# Patient Record
Sex: FEMALE | Race: OTHER | NOT HISPANIC OR LATINO
[De-identification: names, ages, dates, MRNs, and addresses within clinical notes are randomized per-mention and may not be internally consistent; named-entity substitution may affect disease eponyms.]

---

## 2018-07-13 ENCOUNTER — RESULT REVIEW (OUTPATIENT)
Age: 43
End: 2018-07-13

## 2018-07-13 ENCOUNTER — OUTPATIENT (OUTPATIENT)
Dept: OUTPATIENT SERVICES | Facility: HOSPITAL | Age: 43
LOS: 1 days | End: 2018-07-13
Payer: COMMERCIAL

## 2018-07-13 VITALS
SYSTOLIC BLOOD PRESSURE: 126 MMHG | DIASTOLIC BLOOD PRESSURE: 87 MMHG | RESPIRATION RATE: 16 BRPM | HEART RATE: 71 BPM | HEIGHT: 65 IN | TEMPERATURE: 99 F | WEIGHT: 184.09 LBS | OXYGEN SATURATION: 100 %

## 2018-07-13 DIAGNOSIS — Z98.890 OTHER SPECIFIED POSTPROCEDURAL STATES: Chronic | ICD-10-CM

## 2018-07-13 DIAGNOSIS — K08.409 PARTIAL LOSS OF TEETH, UNSPECIFIED CAUSE, UNSPECIFIED CLASS: Chronic | ICD-10-CM

## 2018-07-13 DIAGNOSIS — Z90.49 ACQUIRED ABSENCE OF OTHER SPECIFIED PARTS OF DIGESTIVE TRACT: Chronic | ICD-10-CM

## 2018-07-13 DIAGNOSIS — Z98.51 TUBAL LIGATION STATUS: Chronic | ICD-10-CM

## 2018-07-13 DIAGNOSIS — N63.20 UNSPECIFIED LUMP IN THE LEFT BREAST, UNSPECIFIED QUADRANT: ICD-10-CM

## 2018-07-13 DIAGNOSIS — Z01.818 ENCOUNTER FOR OTHER PREPROCEDURAL EXAMINATION: ICD-10-CM

## 2018-07-13 DIAGNOSIS — D48.62 NEOPLASM OF UNCERTAIN BEHAVIOR OF LEFT BREAST: ICD-10-CM

## 2018-07-13 LAB
ANION GAP SERPL CALC-SCNC: 8 MMOL/L — SIGNIFICANT CHANGE UP (ref 5–17)
BUN SERPL-MCNC: 10 MG/DL — SIGNIFICANT CHANGE UP (ref 7–23)
CALCIUM SERPL-MCNC: 8.6 MG/DL — SIGNIFICANT CHANGE UP (ref 8.5–10.1)
CHLORIDE SERPL-SCNC: 103 MMOL/L — SIGNIFICANT CHANGE UP (ref 96–108)
CO2 SERPL-SCNC: 30 MMOL/L — SIGNIFICANT CHANGE UP (ref 22–31)
CREAT SERPL-MCNC: 0.99 MG/DL — SIGNIFICANT CHANGE UP (ref 0.5–1.3)
GLUCOSE SERPL-MCNC: 83 MG/DL — SIGNIFICANT CHANGE UP (ref 70–99)
HCT VFR BLD CALC: 37.8 % — SIGNIFICANT CHANGE UP (ref 34.5–45)
HGB BLD-MCNC: 13 G/DL — SIGNIFICANT CHANGE UP (ref 11.5–15.5)
MCHC RBC-ENTMCNC: 30.2 PG — SIGNIFICANT CHANGE UP (ref 27–34)
MCHC RBC-ENTMCNC: 34.4 GM/DL — SIGNIFICANT CHANGE UP (ref 32–36)
MCV RBC AUTO: 87.7 FL — SIGNIFICANT CHANGE UP (ref 80–100)
NRBC # BLD: 0 /100 WBCS — SIGNIFICANT CHANGE UP (ref 0–0)
PLATELET # BLD AUTO: 335 K/UL — SIGNIFICANT CHANGE UP (ref 150–400)
POTASSIUM SERPL-MCNC: 3 MMOL/L — LOW (ref 3.5–5.3)
POTASSIUM SERPL-SCNC: 3 MMOL/L — LOW (ref 3.5–5.3)
RBC # BLD: 4.31 M/UL — SIGNIFICANT CHANGE UP (ref 3.8–5.2)
RBC # FLD: 11.9 % — SIGNIFICANT CHANGE UP (ref 10.3–14.5)
SODIUM SERPL-SCNC: 141 MMOL/L — SIGNIFICANT CHANGE UP (ref 135–145)
WBC # BLD: 3.87 K/UL — SIGNIFICANT CHANGE UP (ref 3.8–10.5)
WBC # FLD AUTO: 3.87 K/UL — SIGNIFICANT CHANGE UP (ref 3.8–10.5)

## 2018-07-13 PROCEDURE — 80048 BASIC METABOLIC PNL TOTAL CA: CPT

## 2018-07-13 PROCEDURE — 84702 CHORIONIC GONADOTROPIN TEST: CPT

## 2018-07-13 PROCEDURE — 85027 COMPLETE CBC AUTOMATED: CPT

## 2018-07-13 PROCEDURE — 88321 CONSLTJ&REPRT SLD PREP ELSWR: CPT

## 2018-07-13 PROCEDURE — G0463: CPT

## 2018-07-13 NOTE — H&P PST ADULT - HISTORY OF PRESENT ILLNESS
This is a 41 y/o female with PMH: Left breast Mass X several years. s/p ( 1/2018): Neddle Biopsy: benign. Intermittent discomfort of Left Breast Mass. Scheduled: Excisional Bipsy Left breast Mass with Mammo Needle Localization.

## 2018-07-13 NOTE — H&P PST ADULT - PMH
Benign breast lumps  1/2018    Neddle Biopsy: benign  Multiparity    Rotator cuff tear, left  12/2017    surgically treated  Vitamin D deficiency Benign breast lumps  1/2018    Neddle Biopsy: benign  Gallstones  ' 2001  Medial meniscus tear  '90's   Bilateral Knees: surgically treated  Multiparity    Rotator cuff tear, left  12/2017    surgically treated  Vitamin D deficiency

## 2018-07-13 NOTE — H&P PST ADULT - NSANTHOSAYNRD_GEN_A_CORE
No. MAYTE screening performed.  STOP BANG Legend: 0-2 = LOW Risk; 3-4 = INTERMEDIATE Risk; 5-8 = HIGH Risk

## 2018-07-13 NOTE — H&P PST ADULT - PROBLEM SELECTOR PLAN 1
Excisional Biopsy Left Breast Mass with Mammo Needle Localization    Labs; CBC, BMP, HCG done at PST. Pre op and Hibiclens instructions reviewed and given. Take routine am meds DOS with sip of water. Avoid NSAIDs and OTC supplements. Verbalized understanding

## 2018-07-24 ENCOUNTER — TRANSCRIPTION ENCOUNTER (OUTPATIENT)
Age: 43
End: 2018-07-24

## 2018-07-24 LAB — PATHOLOGY CONSULT NOTE: SIGNIFICANT CHANGE UP

## 2018-07-25 ENCOUNTER — OUTPATIENT (OUTPATIENT)
Dept: OUTPATIENT SERVICES | Facility: HOSPITAL | Age: 43
LOS: 1 days | End: 2018-07-25
Payer: COMMERCIAL

## 2018-07-25 ENCOUNTER — RESULT REVIEW (OUTPATIENT)
Age: 43
End: 2018-07-25

## 2018-07-25 VITALS
RESPIRATION RATE: 16 BRPM | HEART RATE: 82 BPM | OXYGEN SATURATION: 100 % | SYSTOLIC BLOOD PRESSURE: 135 MMHG | DIASTOLIC BLOOD PRESSURE: 75 MMHG

## 2018-07-25 VITALS
HEIGHT: 65 IN | RESPIRATION RATE: 16 BRPM | SYSTOLIC BLOOD PRESSURE: 130 MMHG | DIASTOLIC BLOOD PRESSURE: 80 MMHG | HEART RATE: 78 BPM | OXYGEN SATURATION: 99 % | WEIGHT: 184.09 LBS | TEMPERATURE: 98 F

## 2018-07-25 DIAGNOSIS — Z98.890 OTHER SPECIFIED POSTPROCEDURAL STATES: Chronic | ICD-10-CM

## 2018-07-25 DIAGNOSIS — Z90.49 ACQUIRED ABSENCE OF OTHER SPECIFIED PARTS OF DIGESTIVE TRACT: Chronic | ICD-10-CM

## 2018-07-25 DIAGNOSIS — Z98.51 TUBAL LIGATION STATUS: Chronic | ICD-10-CM

## 2018-07-25 DIAGNOSIS — K08.409 PARTIAL LOSS OF TEETH, UNSPECIFIED CAUSE, UNSPECIFIED CLASS: Chronic | ICD-10-CM

## 2018-07-25 DIAGNOSIS — D48.62 NEOPLASM OF UNCERTAIN BEHAVIOR OF LEFT BREAST: ICD-10-CM

## 2018-07-25 LAB — HCG UR QL: NEGATIVE — SIGNIFICANT CHANGE UP

## 2018-07-25 PROCEDURE — 76098 X-RAY EXAM SURGICAL SPECIMEN: CPT | Mod: 26

## 2018-07-25 PROCEDURE — 88305 TISSUE EXAM BY PATHOLOGIST: CPT | Mod: 26

## 2018-07-25 RX ORDER — CEFAZOLIN SODIUM 1 G
1000 VIAL (EA) INJECTION ONCE
Qty: 0 | Refills: 0 | Status: DISCONTINUED | OUTPATIENT
Start: 2018-07-25 | End: 2018-07-25

## 2018-07-25 RX ORDER — ONDANSETRON 8 MG/1
4 TABLET, FILM COATED ORAL ONCE
Qty: 0 | Refills: 0 | Status: COMPLETED | OUTPATIENT
Start: 2018-07-25 | End: 2018-07-25

## 2018-07-25 RX ORDER — HYDROMORPHONE HYDROCHLORIDE 2 MG/ML
0.5 INJECTION INTRAMUSCULAR; INTRAVENOUS; SUBCUTANEOUS
Qty: 0 | Refills: 0 | Status: DISCONTINUED | OUTPATIENT
Start: 2018-07-25 | End: 2018-07-26

## 2018-07-25 RX ORDER — OXYCODONE HYDROCHLORIDE 5 MG/1
5 TABLET ORAL ONCE
Qty: 0 | Refills: 0 | Status: DISCONTINUED | OUTPATIENT
Start: 2018-07-25 | End: 2018-07-26

## 2018-07-25 RX ORDER — SODIUM CHLORIDE 9 MG/ML
1000 INJECTION, SOLUTION INTRAVENOUS
Qty: 0 | Refills: 0 | Status: DISCONTINUED | OUTPATIENT
Start: 2018-07-25 | End: 2018-07-26

## 2018-07-25 RX ORDER — SODIUM CHLORIDE 9 MG/ML
3 INJECTION INTRAMUSCULAR; INTRAVENOUS; SUBCUTANEOUS ONCE
Qty: 0 | Refills: 0 | Status: DISCONTINUED | OUTPATIENT
Start: 2018-07-25 | End: 2018-07-25

## 2018-07-25 RX ORDER — SODIUM CHLORIDE 9 MG/ML
1000 INJECTION, SOLUTION INTRAVENOUS
Qty: 0 | Refills: 0 | Status: DISCONTINUED | OUTPATIENT
Start: 2018-07-25 | End: 2018-07-25

## 2018-07-25 RX ADMIN — SODIUM CHLORIDE 100 MILLILITER(S): 9 INJECTION, SOLUTION INTRAVENOUS at 14:06

## 2018-07-25 RX ADMIN — ONDANSETRON 4 MILLIGRAM(S): 8 TABLET, FILM COATED ORAL at 14:09

## 2018-07-25 NOTE — ASU PATIENT PROFILE, ADULT - PMH
Benign breast lumps  1/2018    Neddle Biopsy: benign  Gallstones  ' 2001  Medial meniscus tear  '90's   Bilateral Knees: surgically treated  Multiparity    Rotator cuff tear, left  12/2017    surgically treated  Vitamin D deficiency

## 2018-07-25 NOTE — ASU PATIENT PROFILE, ADULT - PSH
H/O needle biopsy  1/2018   Left Breast Mass: benign  H/O tubal ligation  ' 2011  H/O wisdom tooth extraction  7-12-18    X2  Normal spontaneous vaginal delivery  '01, '04  and ' 09  S/P abdominoplasty  ' 2012  S/P left knee arthroscopy  ' 90's  S/P right knee arthroscopy  '90's  S/P rotator cuff repair  12/2017  Left  Status post cholecystectomy  ' 2001   Laproscopic

## 2018-07-25 NOTE — ASU DISCHARGE PLAN (ADULT/PEDIATRIC). - MEDICATION SUMMARY - MEDICATIONS TO TAKE
I will START or STAY ON the medications listed below when I get home from the hospital:    Norco 5 mg-325 mg oral tablet  -- 1 tab(s) by mouth every 4 to 6 hours, As Needed MDD:24   -- Caution federal law prohibits the transfer of this drug to any person other  than the person for whom it was prescribed.  May cause drowsiness.  Alcohol may intensify this effect.  Use care when operating dangerous machinery.  This product contains acetaminophen.  Do not use  with any other product containing acetaminophen to prevent possible liver damage.  Using more of this medication than prescribed may cause serious breathing problems.    -- Indication: For pain    amoxicillin 500 mg oral capsule  -- 1 cap(s) by mouth 3 times a day X 7 days: started 7-13-18 ( prophylaxis s/p [place] tooth extraction 7-12-18)  -- Indication: For prior    Vitamin D2 50,000 intl units (1.25 mg) oral capsule  -- 1 cap(s) by mouth once a week: Thursday   -- Indication: For prior

## 2018-07-25 NOTE — ASU DISCHARGE PLAN (ADULT/PEDIATRIC). - NURSING INSTRUCTIONS
Remember to wash your hands frequently  Call your surgeon if your wound or surgical site shows any sign of infection such as: 1. Fever/chills 2.  Pus or drainage,  3. bad smell coming from area 4. hot to touch 5. redness or swelling 6. painful or sore to touch.

## 2018-07-25 NOTE — ASU DISCHARGE PLAN (ADULT/PEDIATRIC). - MEDICATION SUMMARY - MEDICATIONS TO STOP TAKING
I will STOP taking the medications listed below when I get home from the hospital:    oxyCODONE-acetaminophen 5 mg-325 mg oral tablet  -- 1 tab(s) by mouth every 6 hours

## 2018-07-26 PROCEDURE — 19281 PERQ DEVICE BREAST 1ST IMAG: CPT

## 2018-07-26 PROCEDURE — 88305 TISSUE EXAM BY PATHOLOGIST: CPT

## 2018-07-26 PROCEDURE — 81025 URINE PREGNANCY TEST: CPT

## 2018-07-26 PROCEDURE — C1889: CPT

## 2018-07-26 PROCEDURE — 19281 PERQ DEVICE BREAST 1ST IMAG: CPT | Mod: LT

## 2018-07-26 PROCEDURE — 76098 X-RAY EXAM SURGICAL SPECIMEN: CPT

## 2018-07-26 PROCEDURE — 19125 EXCISION BREAST LESION: CPT | Mod: LT

## 2018-07-31 LAB — SURGICAL PATHOLOGY FINAL REPORT - CH: SIGNIFICANT CHANGE UP

## 2019-09-11 ENCOUNTER — TRANSCRIPTION ENCOUNTER (OUTPATIENT)
Age: 44
End: 2019-09-11

## 2019-11-18 NOTE — ASU PREOP CHECKLIST - PATIENT SENT TO
Complex Repair And Skin Substitute Graft Text: The defect edges were debeveled with a #15 scalpel blade.  The primary defect was closed partially with a complex linear closure.  Given the location of the remaining defect, shape of the defect and the proximity to free margins a skin substitute graft was deemed most appropriate to repair the remaining defect.  The graft was trimmed to fit the size of the remaining defect.  The graft was then placed in the primary defect, oriented appropriately, and sutured into place. operating room

## 2020-10-19 ENCOUNTER — TRANSCRIPTION ENCOUNTER (OUTPATIENT)
Age: 45
End: 2020-10-19

## 2021-07-05 ENCOUNTER — TRANSCRIPTION ENCOUNTER (OUTPATIENT)
Age: 46
End: 2021-07-05

## 2022-02-17 ENCOUNTER — TRANSCRIPTION ENCOUNTER (OUTPATIENT)
Age: 47
End: 2022-02-17

## 2022-03-19 NOTE — BRIEF OPERATIVE NOTE - PRE-OP
General Surgery Progress Note    Patient:  Ailyn Chaney Date:  3/19/2022   :  1950 Attending:  Vijay Altamirano MD   [phone] [id number] 24 Hour Total [phone] Click on this checkbox to enter Pre-Op Dx

## 2022-03-29 NOTE — H&P PST ADULT - HEMATOLOGY/LYMPHATICS COMMENTS
Impression: Regular astigmatism, bilateral: H52.223. Plan: New spec Rx given - Discussed changes and possible adaptation period. 

RTC 1 year/PRN Vitamin D Deficiency

## 2022-05-06 PROBLEM — S83.249A OTHER TEAR OF MEDIAL MENISCUS, CURRENT INJURY, UNSPECIFIED KNEE, INITIAL ENCOUNTER: Chronic | Status: ACTIVE | Noted: 2018-07-13

## 2022-05-06 PROBLEM — M75.102 UNSPECIFIED ROTATOR CUFF TEAR OR RUPTURE OF LEFT SHOULDER, NOT SPECIFIED AS TRAUMATIC: Chronic | Status: ACTIVE | Noted: 2018-07-13

## 2022-05-06 PROBLEM — Z64.1 PROBLEMS RELATED TO MULTIPARITY: Chronic | Status: ACTIVE | Noted: 2018-07-13

## 2022-05-06 PROBLEM — E55.9 VITAMIN D DEFICIENCY, UNSPECIFIED: Chronic | Status: ACTIVE | Noted: 2018-07-13

## 2022-05-06 PROBLEM — N63.0 UNSPECIFIED LUMP IN UNSPECIFIED BREAST: Chronic | Status: ACTIVE | Noted: 2018-07-13

## 2022-05-06 PROBLEM — K80.20 CALCULUS OF GALLBLADDER WITHOUT CHOLECYSTITIS WITHOUT OBSTRUCTION: Chronic | Status: ACTIVE | Noted: 2018-07-13

## 2022-05-16 RX ORDER — AMOXICILLIN 250 MG/5ML
1 SUSPENSION, RECONSTITUTED, ORAL (ML) ORAL
Qty: 0 | Refills: 0 | DISCHARGE
Start: 2022-05-16 | End: 2022-05-26

## 2022-05-17 ENCOUNTER — OUTPATIENT (OUTPATIENT)
Dept: OUTPATIENT SERVICES | Facility: HOSPITAL | Age: 47
LOS: 1 days | End: 2022-05-17
Payer: COMMERCIAL

## 2022-05-17 VITALS
OXYGEN SATURATION: 97 % | DIASTOLIC BLOOD PRESSURE: 89 MMHG | HEIGHT: 64 IN | SYSTOLIC BLOOD PRESSURE: 138 MMHG | WEIGHT: 197.09 LBS

## 2022-05-17 DIAGNOSIS — Z90.13 ACQUIRED ABSENCE OF BILATERAL BREASTS AND NIPPLES: ICD-10-CM

## 2022-05-17 DIAGNOSIS — Z85.3 PERSONAL HISTORY OF MALIGNANT NEOPLASM OF BREAST: ICD-10-CM

## 2022-05-17 DIAGNOSIS — Z98.890 OTHER SPECIFIED POSTPROCEDURAL STATES: Chronic | ICD-10-CM

## 2022-05-17 DIAGNOSIS — Z90.49 ACQUIRED ABSENCE OF OTHER SPECIFIED PARTS OF DIGESTIVE TRACT: Chronic | ICD-10-CM

## 2022-05-17 DIAGNOSIS — I10 ESSENTIAL (PRIMARY) HYPERTENSION: ICD-10-CM

## 2022-05-17 DIAGNOSIS — Z98.51 TUBAL LIGATION STATUS: Chronic | ICD-10-CM

## 2022-05-17 DIAGNOSIS — N62 HYPERTROPHY OF BREAST: ICD-10-CM

## 2022-05-17 DIAGNOSIS — Z01.818 ENCOUNTER FOR OTHER PREPROCEDURAL EXAMINATION: ICD-10-CM

## 2022-05-17 DIAGNOSIS — K08.409 PARTIAL LOSS OF TEETH, UNSPECIFIED CAUSE, UNSPECIFIED CLASS: Chronic | ICD-10-CM

## 2022-05-17 LAB
ANION GAP SERPL CALC-SCNC: 6 MMOL/L — SIGNIFICANT CHANGE UP (ref 5–17)
BUN SERPL-MCNC: 12 MG/DL — SIGNIFICANT CHANGE UP (ref 7–23)
CALCIUM SERPL-MCNC: 8.7 MG/DL — SIGNIFICANT CHANGE UP (ref 8.4–10.5)
CHLORIDE SERPL-SCNC: 105 MMOL/L — SIGNIFICANT CHANGE UP (ref 96–108)
CO2 SERPL-SCNC: 29 MMOL/L — SIGNIFICANT CHANGE UP (ref 22–31)
CREAT SERPL-MCNC: 1.04 MG/DL — SIGNIFICANT CHANGE UP (ref 0.5–1.3)
EGFR: 67 ML/MIN/1.73M2 — SIGNIFICANT CHANGE UP
GLUCOSE SERPL-MCNC: 89 MG/DL — SIGNIFICANT CHANGE UP (ref 70–99)
HCT VFR BLD CALC: 40.2 % — SIGNIFICANT CHANGE UP (ref 34.5–45)
HGB BLD-MCNC: 13.4 G/DL — SIGNIFICANT CHANGE UP (ref 11.5–15.5)
MCHC RBC-ENTMCNC: 29.8 PG — SIGNIFICANT CHANGE UP (ref 27–34)
MCHC RBC-ENTMCNC: 33.3 GM/DL — SIGNIFICANT CHANGE UP (ref 32–36)
MCV RBC AUTO: 89.5 FL — SIGNIFICANT CHANGE UP (ref 80–100)
NRBC # BLD: 0 /100 WBCS — SIGNIFICANT CHANGE UP (ref 0–0)
PLATELET # BLD AUTO: 306 K/UL — SIGNIFICANT CHANGE UP (ref 150–400)
POTASSIUM SERPL-MCNC: 3.6 MMOL/L — SIGNIFICANT CHANGE UP (ref 3.5–5.3)
POTASSIUM SERPL-SCNC: 3.6 MMOL/L — SIGNIFICANT CHANGE UP (ref 3.5–5.3)
RBC # BLD: 4.49 M/UL — SIGNIFICANT CHANGE UP (ref 3.8–5.2)
RBC # FLD: 12.2 % — SIGNIFICANT CHANGE UP (ref 10.3–14.5)
SODIUM SERPL-SCNC: 140 MMOL/L — SIGNIFICANT CHANGE UP (ref 135–145)
WBC # BLD: 4.55 K/UL — SIGNIFICANT CHANGE UP (ref 3.8–10.5)
WBC # FLD AUTO: 4.55 K/UL — SIGNIFICANT CHANGE UP (ref 3.8–10.5)

## 2022-05-17 PROCEDURE — 36415 COLL VENOUS BLD VENIPUNCTURE: CPT

## 2022-05-17 PROCEDURE — 80048 BASIC METABOLIC PNL TOTAL CA: CPT

## 2022-05-17 PROCEDURE — G0463: CPT

## 2022-05-17 PROCEDURE — 85027 COMPLETE CBC AUTOMATED: CPT

## 2022-05-17 RX ORDER — AMOXICILLIN 250 MG/5ML
1 SUSPENSION, RECONSTITUTED, ORAL (ML) ORAL
Qty: 0 | Refills: 0 | DISCHARGE

## 2022-05-17 NOTE — H&P PST ADULT - PROBLEM SELECTOR PLAN 1
Scheduled for staged bilateral breast reduction with Dr Palacio on 05/31/2022.  COVID-19 pcr testing information provided.  Pre op instructions given and patient verbalized understanding.  CBC, BMP pending.  EK requested from pcp.  NPO after midnight night before procedure.  To stop all ASA, NSAIDs, vitamins and supplements 1 week prior to procedure.  Chlorhexidene wash given with instructions.

## 2022-05-17 NOTE — H&P PST ADULT - NSICDXPASTSURGICALHX_GEN_ALL_CORE_FT
PAST SURGICAL HISTORY:  H/O needle biopsy 1/2018, Left Breast Mass: benign  3/2022- right pos for malignancy    H/O tubal ligation ' 2011    H/O wisdom tooth extraction 7-12-18    X2    Normal spontaneous vaginal delivery '01, '04  and ' 09    S/P abdominoplasty ' 2012    S/P left knee arthroscopy ' 90's    S/P right knee arthroscopy '90's    S/P rotator cuff repair 12/2017  Left    Status post cholecystectomy ' 2001   Laproscopic

## 2022-05-17 NOTE — H&P PST ADULT - FALL HARM RISK - UNIVERSAL INTERVENTIONS
Bed in lowest position, wheels locked, appropriate side rails in place/Call bell, personal items and telephone in reach/Instruct patient to call for assistance before getting out of bed or chair/Non-slip footwear when patient is out of bed/[place] to call system/Physically safe environment - no spills, clutter or unnecessary equipment/Purposeful Proactive Rounding/Room/bathroom lighting operational, light cord in reach

## 2022-05-17 NOTE — H&P PST ADULT - HISTORY OF PRESENT ILLNESS
Detail Level: Zone This is a 43 y/o female with PMH: Left breast Mass X several years. s/p ( 1/2018): Neddle Biopsy: benign. Intermittent discom 47 y/o female with [facility] of HTN presents for [facility].  C/O abnormal mammogram in Feb 2022 resulting in biopsy of breast which was post for malignancy.  Having staged breast surgery with end result to be a mastectomy with implant placement.  Currently on Antibiotic for throat infection with 10 day course to completed 5/26/2022.  Other wise feeling well today at PST.  Scheduled for staged bilateral breast reduction with Dr Palacio on 05/31/2022.  COVID-19 pcr testing information provided

## 2022-05-17 NOTE — H&P PST ADULT - NSICDXFAMILYHX_GEN_ALL_CORE_FT
FAMILY HISTORY:  Father  Still living? Unknown  FH: renal failure, Age at diagnosis: Age Unknown  FH: type 2 diabetes, Age at diagnosis: Age Unknown    Mother  Still living? Unknown  FH: CAD (coronary artery disease), Age at diagnosis: Age Unknown  FH: type 2 diabetes, Age at diagnosis: Age Unknown    Sibling  Still living? Unknown  FH: non-Hodgkin's lymphoma, Age at diagnosis: Age Unknown

## 2022-05-17 NOTE — H&P PST ADULT - NSICDXPASTMEDICALHX_GEN_ALL_CORE_FT
PAST MEDICAL HISTORY:  Benign breast lumps 1/2018    Neddle Biopsy: benign    Gallstones ' 2001    Medial meniscus tear '90's   Bilateral Knees: surgically treated    Multiparity     Personal history of malignant neoplasm of breast     Rotator cuff tear, left 12/2017    surgically treated    Vitamin D deficiency

## 2022-05-17 NOTE — H&P PST ADULT - NSANTHOSAYNRD_GEN_A_CORE
neck 14 inches/No. MAYTE screening performed.  STOP BANG Legend: 0-2 = LOW Risk; 3-4 = INTERMEDIATE Risk; 5-8 = HIGH Risk

## 2022-05-28 ENCOUNTER — NON-APPOINTMENT (OUTPATIENT)
Age: 47
End: 2022-05-28

## 2022-05-30 ENCOUNTER — TRANSCRIPTION ENCOUNTER (OUTPATIENT)
Age: 47
End: 2022-05-30

## 2022-05-31 ENCOUNTER — RESULT REVIEW (OUTPATIENT)
Age: 47
End: 2022-05-31

## 2022-05-31 ENCOUNTER — TRANSCRIPTION ENCOUNTER (OUTPATIENT)
Age: 47
End: 2022-05-31

## 2022-05-31 ENCOUNTER — OUTPATIENT (OUTPATIENT)
Dept: OUTPATIENT SERVICES | Facility: HOSPITAL | Age: 47
LOS: 1 days | End: 2022-05-31
Payer: COMMERCIAL

## 2022-05-31 VITALS
TEMPERATURE: 99 F | HEIGHT: 64 IN | RESPIRATION RATE: 16 BRPM | OXYGEN SATURATION: 96 % | DIASTOLIC BLOOD PRESSURE: 82 MMHG | WEIGHT: 197.09 LBS | SYSTOLIC BLOOD PRESSURE: 131 MMHG | HEART RATE: 76 BPM

## 2022-05-31 VITALS
TEMPERATURE: 97 F | RESPIRATION RATE: 16 BRPM | DIASTOLIC BLOOD PRESSURE: 65 MMHG | HEART RATE: 78 BPM | OXYGEN SATURATION: 98 % | SYSTOLIC BLOOD PRESSURE: 130 MMHG

## 2022-05-31 DIAGNOSIS — Z98.51 TUBAL LIGATION STATUS: Chronic | ICD-10-CM

## 2022-05-31 DIAGNOSIS — Z98.890 OTHER SPECIFIED POSTPROCEDURAL STATES: Chronic | ICD-10-CM

## 2022-05-31 DIAGNOSIS — Z90.13 ACQUIRED ABSENCE OF BILATERAL BREASTS AND NIPPLES: ICD-10-CM

## 2022-05-31 DIAGNOSIS — K08.409 PARTIAL LOSS OF TEETH, UNSPECIFIED CAUSE, UNSPECIFIED CLASS: Chronic | ICD-10-CM

## 2022-05-31 DIAGNOSIS — Z85.3 PERSONAL HISTORY OF MALIGNANT NEOPLASM OF BREAST: ICD-10-CM

## 2022-05-31 DIAGNOSIS — Z90.49 ACQUIRED ABSENCE OF OTHER SPECIFIED PARTS OF DIGESTIVE TRACT: Chronic | ICD-10-CM

## 2022-05-31 DIAGNOSIS — N62 HYPERTROPHY OF BREAST: ICD-10-CM

## 2022-05-31 LAB — SARS-COV-2 RNA SPEC QL NAA+PROBE: SIGNIFICANT CHANGE UP

## 2022-05-31 PROCEDURE — 19318 BREAST REDUCTION: CPT | Mod: 50

## 2022-05-31 PROCEDURE — 87635 SARS-COV-2 COVID-19 AMP PRB: CPT

## 2022-05-31 PROCEDURE — 88305 TISSUE EXAM BY PATHOLOGIST: CPT | Mod: 26

## 2022-05-31 PROCEDURE — 88305 TISSUE EXAM BY PATHOLOGIST: CPT

## 2022-05-31 PROCEDURE — C9399: CPT

## 2022-05-31 RX ORDER — ACETAMINOPHEN 500 MG
2 TABLET ORAL
Qty: 0 | Refills: 0 | DISCHARGE

## 2022-05-31 RX ORDER — ERGOCALCIFEROL 1.25 MG/1
1 CAPSULE ORAL
Qty: 0 | Refills: 0 | DISCHARGE

## 2022-05-31 RX ORDER — SODIUM CHLORIDE 9 MG/ML
1000 INJECTION, SOLUTION INTRAVENOUS
Refills: 0 | Status: DISCONTINUED | OUTPATIENT
Start: 2022-05-31 | End: 2022-05-31

## 2022-05-31 RX ORDER — ONDANSETRON 8 MG/1
4 TABLET, FILM COATED ORAL ONCE
Refills: 0 | Status: COMPLETED | OUTPATIENT
Start: 2022-05-31 | End: 2022-05-31

## 2022-05-31 RX ORDER — MULTIVIT-MIN/FERROUS GLUCONATE 9 MG/15 ML
1 LIQUID (ML) ORAL
Qty: 0 | Refills: 0 | DISCHARGE

## 2022-05-31 RX ORDER — OXYCODONE AND ACETAMINOPHEN 5; 325 MG/1; MG/1
1 TABLET ORAL ONCE
Refills: 0 | Status: DISCONTINUED | OUTPATIENT
Start: 2022-05-31 | End: 2022-05-31

## 2022-05-31 RX ORDER — HYDROMORPHONE HYDROCHLORIDE 2 MG/ML
0.5 INJECTION INTRAMUSCULAR; INTRAVENOUS; SUBCUTANEOUS ONCE
Refills: 0 | Status: DISCONTINUED | OUTPATIENT
Start: 2022-05-31 | End: 2022-05-31

## 2022-05-31 RX ORDER — METOPROLOL TARTRATE 50 MG
1 TABLET ORAL
Qty: 0 | Refills: 0 | DISCHARGE

## 2022-05-31 RX ORDER — ONDANSETRON 8 MG/1
4 TABLET, FILM COATED ORAL ONCE
Refills: 0 | Status: DISCONTINUED | OUTPATIENT
Start: 2022-05-31 | End: 2022-05-31

## 2022-05-31 RX ORDER — VITAMIN E 100 UNIT
1 CAPSULE ORAL
Qty: 0 | Refills: 0 | DISCHARGE

## 2022-05-31 RX ADMIN — SODIUM CHLORIDE 50 MILLILITER(S): 9 INJECTION, SOLUTION INTRAVENOUS at 10:01

## 2022-05-31 RX ADMIN — HYDROMORPHONE HYDROCHLORIDE 0.5 MILLIGRAM(S): 2 INJECTION INTRAMUSCULAR; INTRAVENOUS; SUBCUTANEOUS at 14:47

## 2022-05-31 RX ADMIN — HYDROMORPHONE HYDROCHLORIDE 0.5 MILLIGRAM(S): 2 INJECTION INTRAMUSCULAR; INTRAVENOUS; SUBCUTANEOUS at 15:00

## 2022-05-31 RX ADMIN — ONDANSETRON 4 MILLIGRAM(S): 8 TABLET, FILM COATED ORAL at 17:30

## 2022-05-31 NOTE — ASU PATIENT PROFILE, ADULT - NSICDXPASTMEDICALHX_GEN_ALL_CORE_FT
PAST MEDICAL HISTORY:  Benign breast lumps 1/2018    Neddle Biopsy: benign    Gallstones ' 2001    Medial meniscus tear '90's   Bilateral Knees: surgically treated    Multiparity     Personal history of malignant neoplasm of breast     Rotator cuff tear, left 12/2017    surgically treated    Vitamin D deficiency      PAST MEDICAL HISTORY:  Benign breast lumps 1/2018    Neddle Biopsy: benign    Gallstones ' 2001    Medial meniscus tear '90's   Bilateral Knees: surgically treated    Multiparity     Personal history of malignant neoplasm of breast right breast    Rotator cuff tear, left 12/2017    surgically treated    Vitamin D deficiency

## 2022-05-31 NOTE — ASU DISCHARGE PLAN (ADULT/PEDIATRIC) - NS MD DC FALL RISK RISK
For information on Fall & Injury Prevention, visit: [email or web address] OR  [email or web address] OR  https://www.cdc.gov/steadi/patient.html

## 2022-05-31 NOTE — ASU DISCHARGE PLAN (ADULT/PEDIATRIC) - CARE PROVIDER_API CALL
Quincy Palacio  PLASTIC SURGERY  [address], [address]  [place] [address]  Phone: [phone]  Fax: [phone]  Established Patient  Scheduled Appointment: 06/06/2022

## 2022-05-31 NOTE — ASU PATIENT PROFILE, ADULT - PRO ARRIVE FROM
CT Heart Calcium report reviewed with a total coronary calcium score of 166.   Copy of report, CT Calcium Screening results letter (moderate coronary plaque identified) and Simple 7 patient education materials mailed to patient.  Report faxed to Dr. Albino Hernandez    Also noted was a finding of calcified RML granuloma and mild bronchiectasis in the lower lungs.    Results letter includes instructions to follow up with your primary care doctor on these findings.    
home

## 2022-06-06 LAB — SURGICAL PATHOLOGY STUDY: SIGNIFICANT CHANGE UP

## 2022-09-27 PROBLEM — Z85.3 PERSONAL HISTORY OF MALIGNANT NEOPLASM OF BREAST: Chronic | Status: ACTIVE | Noted: 2022-05-17

## 2022-09-29 ENCOUNTER — OUTPATIENT (OUTPATIENT)
Dept: OUTPATIENT SERVICES | Facility: HOSPITAL | Age: 47
LOS: 1 days | End: 2022-09-29
Payer: COMMERCIAL

## 2022-09-29 ENCOUNTER — APPOINTMENT (OUTPATIENT)
Dept: ULTRASOUND IMAGING | Facility: HOSPITAL | Age: 47
End: 2022-09-29

## 2022-09-29 DIAGNOSIS — Z98.890 OTHER SPECIFIED POSTPROCEDURAL STATES: Chronic | ICD-10-CM

## 2022-09-29 DIAGNOSIS — Z90.49 ACQUIRED ABSENCE OF OTHER SPECIFIED PARTS OF DIGESTIVE TRACT: Chronic | ICD-10-CM

## 2022-09-29 DIAGNOSIS — Z00.00 ENCOUNTER FOR GENERAL ADULT MEDICAL EXAMINATION WITHOUT ABNORMAL FINDINGS: ICD-10-CM

## 2022-09-29 DIAGNOSIS — Z98.51 TUBAL LIGATION STATUS: Chronic | ICD-10-CM

## 2022-09-29 DIAGNOSIS — K08.409 PARTIAL LOSS OF TEETH, UNSPECIFIED CAUSE, UNSPECIFIED CLASS: Chronic | ICD-10-CM

## 2022-09-29 PROCEDURE — 76642 ULTRASOUND BREAST LIMITED: CPT | Mod: 26,LT

## 2022-09-29 PROCEDURE — 76642 ULTRASOUND BREAST LIMITED: CPT

## 2022-11-18 ENCOUNTER — NON-APPOINTMENT (OUTPATIENT)
Age: 47
End: 2022-11-18

## 2023-05-23 ENCOUNTER — OUTPATIENT (OUTPATIENT)
Dept: OUTPATIENT SERVICES | Facility: HOSPITAL | Age: 48
LOS: 1 days | End: 2023-05-23
Payer: COMMERCIAL

## 2023-05-23 VITALS
WEIGHT: 194.89 LBS | TEMPERATURE: 98 F | RESPIRATION RATE: 18 BRPM | OXYGEN SATURATION: 100 % | HEIGHT: 65 IN | SYSTOLIC BLOOD PRESSURE: 125 MMHG | DIASTOLIC BLOOD PRESSURE: 84 MMHG | HEART RATE: 77 BPM

## 2023-05-23 DIAGNOSIS — Z98.890 OTHER SPECIFIED POSTPROCEDURAL STATES: Chronic | ICD-10-CM

## 2023-05-23 DIAGNOSIS — Z85.3 PERSONAL HISTORY OF MALIGNANT NEOPLASM OF BREAST: ICD-10-CM

## 2023-05-23 DIAGNOSIS — I10 ESSENTIAL (PRIMARY) HYPERTENSION: ICD-10-CM

## 2023-05-23 DIAGNOSIS — Z96.653 PRESENCE OF ARTIFICIAL KNEE JOINT, BILATERAL: Chronic | ICD-10-CM

## 2023-05-23 DIAGNOSIS — Z90.13 ACQUIRED ABSENCE OF BILATERAL BREASTS AND NIPPLES: ICD-10-CM

## 2023-05-23 DIAGNOSIS — Z01.818 ENCOUNTER FOR OTHER PREPROCEDURAL EXAMINATION: ICD-10-CM

## 2023-05-23 DIAGNOSIS — Z90.49 ACQUIRED ABSENCE OF OTHER SPECIFIED PARTS OF DIGESTIVE TRACT: Chronic | ICD-10-CM

## 2023-05-23 DIAGNOSIS — Z98.51 TUBAL LIGATION STATUS: Chronic | ICD-10-CM

## 2023-05-23 DIAGNOSIS — K08.409 PARTIAL LOSS OF TEETH, UNSPECIFIED CAUSE, UNSPECIFIED CLASS: Chronic | ICD-10-CM

## 2023-05-23 DIAGNOSIS — N65.0 DEFORMITY OF RECONSTRUCTED BREAST: ICD-10-CM

## 2023-05-23 DIAGNOSIS — Z90.13 ACQUIRED ABSENCE OF BILATERAL BREASTS AND NIPPLES: Chronic | ICD-10-CM

## 2023-05-23 LAB
ANION GAP SERPL CALC-SCNC: 7 MMOL/L — SIGNIFICANT CHANGE UP (ref 5–17)
BUN SERPL-MCNC: 11 MG/DL — SIGNIFICANT CHANGE UP (ref 7–23)
CALCIUM SERPL-MCNC: 8.8 MG/DL — SIGNIFICANT CHANGE UP (ref 8.4–10.5)
CHLORIDE SERPL-SCNC: 106 MMOL/L — SIGNIFICANT CHANGE UP (ref 96–108)
CO2 SERPL-SCNC: 26 MMOL/L — SIGNIFICANT CHANGE UP (ref 22–31)
CREAT SERPL-MCNC: 1.08 MG/DL — SIGNIFICANT CHANGE UP (ref 0.5–1.3)
EGFR: 63 ML/MIN/1.73M2 — SIGNIFICANT CHANGE UP
GLUCOSE SERPL-MCNC: 93 MG/DL — SIGNIFICANT CHANGE UP (ref 70–99)
HCT VFR BLD CALC: 39.1 % — SIGNIFICANT CHANGE UP (ref 34.5–45)
HGB BLD-MCNC: 13.2 G/DL — SIGNIFICANT CHANGE UP (ref 11.5–15.5)
MCHC RBC-ENTMCNC: 30.8 PG — SIGNIFICANT CHANGE UP (ref 27–34)
MCHC RBC-ENTMCNC: 33.8 GM/DL — SIGNIFICANT CHANGE UP (ref 32–36)
MCV RBC AUTO: 91.4 FL — SIGNIFICANT CHANGE UP (ref 80–100)
NRBC # BLD: 0 /100 WBCS — SIGNIFICANT CHANGE UP (ref 0–0)
PLATELET # BLD AUTO: 298 K/UL — SIGNIFICANT CHANGE UP (ref 150–400)
POTASSIUM SERPL-MCNC: 3.7 MMOL/L — SIGNIFICANT CHANGE UP (ref 3.5–5.3)
POTASSIUM SERPL-SCNC: 3.7 MMOL/L — SIGNIFICANT CHANGE UP (ref 3.5–5.3)
RBC # BLD: 4.28 M/UL — SIGNIFICANT CHANGE UP (ref 3.8–5.2)
RBC # FLD: 12 % — SIGNIFICANT CHANGE UP (ref 10.3–14.5)
SODIUM SERPL-SCNC: 139 MMOL/L — SIGNIFICANT CHANGE UP (ref 135–145)
WBC # BLD: 4.7 K/UL — SIGNIFICANT CHANGE UP (ref 3.8–10.5)
WBC # FLD AUTO: 4.7 K/UL — SIGNIFICANT CHANGE UP (ref 3.8–10.5)

## 2023-05-23 PROCEDURE — 36415 COLL VENOUS BLD VENIPUNCTURE: CPT

## 2023-05-23 PROCEDURE — G0463: CPT

## 2023-05-23 PROCEDURE — 93005 ELECTROCARDIOGRAM TRACING: CPT

## 2023-05-23 PROCEDURE — 93010 ELECTROCARDIOGRAM REPORT: CPT | Mod: NC

## 2023-05-23 PROCEDURE — 80048 BASIC METABOLIC PNL TOTAL CA: CPT

## 2023-05-23 PROCEDURE — 85027 COMPLETE CBC AUTOMATED: CPT

## 2023-05-23 RX ORDER — SODIUM CHLORIDE 9 MG/ML
1000 INJECTION, SOLUTION INTRAVENOUS
Refills: 0 | Status: DISCONTINUED | OUTPATIENT
Start: 2023-05-30 | End: 2023-05-30

## 2023-05-23 NOTE — H&P PST ADULT - PROBLEM SELECTOR PLAN 1
Scheduled for bilateral breast revision reconstruction with Dr Palacio on 05/30/2023.  Pre op instructions given and patient verbalized understanding.  CBC, BMP and EKG pending.  NPO after midnight night before procedure.  To stop all ASA, NSAIDs vitamins and supplements 1 week prior to procedure.  Chlorhexidene wash given with instructions

## 2023-05-23 NOTE — H&P PST ADULT - NSICDXFAMILYHX_GEN_ALL_CORE_FT
FAMILY HISTORY:  Father  Still living? Unknown  FH: type 2 diabetes, Age at diagnosis: Age Unknown    Mother  Still living? Unknown  FH: hypertension, Age at diagnosis: Age Unknown

## 2023-05-23 NOTE — H&P PST ADULT - NSICDXPASTSURGICALHX_GEN_ALL_CORE_FT
PAST SURGICAL HISTORY:  H/O abdominoplasty     H/O tubal ligation     History of arthroscopy of left shoulder     History of bilateral knee arthroplasty     S/P bilateral mastectomy

## 2023-05-23 NOTE — H&P PST ADULT - HISTORY OF PRESENT ILLNESS
48 y/o female with PMH of HTN and right breast DCIS (s/p bilateral mastectomy - no chemo or radiation) rpesents for PST.  For follow up breast surgery following mastectomy.  Felling well at [facility] today.  Scheduled for bilateral breast revision reconstruction with Dr Palacio on 05/30/2023.

## 2023-05-23 NOTE — H&P PST ADULT - NSANTHBPHIGHRD_ENT_A_CORE
David Rojo  Neurology Follow-up  [facility] Neurology    Date of Service: 11/16/2022    Subjective:   CC: Follow up today regarding: New onset speech impairment and right-sided numbness    Events noted. Chart and lab reviewed. Feels back to normal.  Notes she was unable to complete MRI due to claustrophobia and is refusing to try again. Father at bedside. ROS : A 10-12 system review obtained and updated today and is unremarkable except as mentioned  in my interval history. family history includes Heart Attack in her mother; Heart Disease in her mother; High Blood Pressure in her mother. She was adopted.     Past Medical History:   Diagnosis Date    Allergic rhinitis     Anxiety     Nosebleed     as a cild due to seasonal allergies    Vestibular migraine 01/27/2022     Current Facility-Administered Medications   Medication Dose Route Frequency Provider Last Rate Last Admin    ondansetron (ZOFRAN-ODT) disintegrating tablet 4 mg  4 mg Oral Q8H PRN Osman Macario MD        Or    ondansetron (ZOFRAN) injection 4 mg  4 mg IntraVENous Q6H PRN Osman Macario MD        polyethylene glycol (GLYCOLAX) packet 17 g  17 g Oral Daily PRN Osman Macario MD        enoxaparin (LOVENOX) injection 40 mg  40 mg SubCUTAneous Q24H Osman Macario MD        perflutren lipid microspheres (DEFINITY) injection 1.5 mL  1.5 mL IntraVENous ONCE PRN Osman Macario MD        acetaminophen (TYLENOL) tablet 650 mg  650 mg Oral Q4H PRN Osman Macario MD        Or    acetaminophen (TYLENOL) suppository 650 mg  650 mg Rectal Q4H PRN Osman Macario MD        rosuvastatin (CRESTOR) tablet 40 mg  40 mg Oral Nightly Osman Macario MD   40 mg at 11/15/22 2040    aspirin EC tablet 81 mg  81 mg Oral Daily Osman Macario MD   81 mg at 11/16/22 9693    Or    aspirin suppository 300 mg  300 mg Rectal Daily Osman Macario MD        labetalol (NORMODYNE;TRANDATE) injection 10 mg  10 mg IntraVENous Q10 Min PRN Osman Macario MD        0.9 % sodium chloride infusion IntraVENous Continuous Luca Javier MD   Stopped at 11/16/22 1410     Allergies   Allergen Reactions    Morphine      Makes her feel weird. reports that she has never smoked. She has never used smokeless tobacco. She reports that she does not drink alcohol and does not use drugs. Objective:  Exam:   Constitutional:   Vitals:    11/16/22 0819 11/16/22 0820 11/16/22 1121 11/16/22 1159   BP: 99/69 104/64 122/81 117/80   Pulse: (!) 103  99 92   Resp: 16 16 14   Temp: 98.1 °F (36.7 °C)  97.8 °F (36.6 °C) 97.8 °F (36.6 °C)   TempSrc: Oral  Oral Oral   SpO2: 97%  99% 100%   Weight:       Height:         General appearance:  Normal development and appear in no acute distress. Mental Status:   Oriented to person, place, problem, and time. Memory: Good immediate recall. Intact remote memory  Normal attention span and concentration. Language: intact naming, repeating and fluency   Good fund of Knowledge. Cranial Nerves:   II: Visual fields: Full. Pupils: equal, round, reactive to light  III,IV,VI: Extra Ocular Movements are intact. No nystagmus  V: Facial sensation is intact  VII: Facial strength and movements: intact and symmetric  IX: Palate elevation is symmetric  XI: Shoulder shrug is intact  XII: Tongue movements are normal  Musculoskeletal: 5/5 in all 4 extremities. Tone: Normal tone. Reflexes: Symmetric 2+ in both arms and legs. Coordination: no pronator drift, no dysmetria with FNF  Sensation: normal to all modalities in both arms and legs.   Gait/Posture: steady gait        Data:  LABS:   Lab Results   Component Value Date/Time     11/16/2022 05:43 AM    K 4.3 11/16/2022 05:43 AM     11/16/2022 05:43 AM    CO2 23 11/16/2022 05:43 AM    BUN 14 11/16/2022 05:43 AM    CREATININE 0.7 11/16/2022 05:43 AM    GFRAA >60 09/06/2022 02:43 PM    LABGLOM >60 11/16/2022 05:43 AM    GLUCOSE 90 11/16/2022 05:43 AM    MG 2.10 04/29/2022 02:34 PM    CALCIUM 9.0 11/16/2022 05:43 AM     Lab Results Component Value Date/Time    WBC 7.2 11/16/2022 05:43 AM    RBC 4.10 11/16/2022 05:43 AM    HGB 12.1 11/16/2022 05:43 AM    HCT 36.1 11/16/2022 05:43 AM    MCV 87.9 11/16/2022 05:43 AM    RDW 12.5 11/16/2022 05:43 AM     11/16/2022 05:43 AM     Lab Results   Component Value Date    INR 1.00 11/14/2022    PROTIME 13.0 11/14/2022       Neuroimaging was independently reviewed by me and discussed results with the patient  I reviewed blood testing and other test results and discussed results with the patient      Impression:    New onset aphasia with right-sided weakness and paresthesia. Possible etiology could include migraine variant versus TIA/CVA. Less likely demyelination. History of migraine with aura    Recommendation    ECHO pending. Monitor on tele. Continue ASA, statin. Continue home BP meds. F/u A1c, lipid panel. PT/OT/SLP    Patient is refusing MRI and wishes to proceed with outpatient, open MRI. Offered IV Ativan to assist with claustrophobia, but patient refused. Father at bedside also tried encouraging to complete while inpatient to no avail. Instructed patient to continue ASA for now until MRI is completed and resulted. She can have further discussion regarding need for ASA following MRI with PCP. Bartolo Charles CNP      This dictation was generated by voice recognition computer software. Although all attempts are made to edit the dictation for accuracy, there may be errors in the transcription that are not intended. Yes

## 2023-05-30 ENCOUNTER — TRANSCRIPTION ENCOUNTER (OUTPATIENT)
Age: 48
End: 2023-05-30

## 2023-05-30 ENCOUNTER — OUTPATIENT (OUTPATIENT)
Dept: OUTPATIENT SERVICES | Facility: HOSPITAL | Age: 48
LOS: 1 days | End: 2023-05-30
Payer: COMMERCIAL

## 2023-05-30 VITALS
HEART RATE: 76 BPM | DIASTOLIC BLOOD PRESSURE: 82 MMHG | RESPIRATION RATE: 15 BRPM | OXYGEN SATURATION: 96 % | SYSTOLIC BLOOD PRESSURE: 143 MMHG | TEMPERATURE: 98 F

## 2023-05-30 VITALS
OXYGEN SATURATION: 100 % | SYSTOLIC BLOOD PRESSURE: 121 MMHG | WEIGHT: 194.89 LBS | HEART RATE: 78 BPM | TEMPERATURE: 98 F | DIASTOLIC BLOOD PRESSURE: 83 MMHG | HEIGHT: 65 IN | RESPIRATION RATE: 16 BRPM

## 2023-05-30 DIAGNOSIS — Z90.49 ACQUIRED ABSENCE OF OTHER SPECIFIED PARTS OF DIGESTIVE TRACT: Chronic | ICD-10-CM

## 2023-05-30 DIAGNOSIS — K08.409 PARTIAL LOSS OF TEETH, UNSPECIFIED CAUSE, UNSPECIFIED CLASS: Chronic | ICD-10-CM

## 2023-05-30 DIAGNOSIS — Z98.890 OTHER SPECIFIED POSTPROCEDURAL STATES: Chronic | ICD-10-CM

## 2023-05-30 DIAGNOSIS — Z98.51 TUBAL LIGATION STATUS: Chronic | ICD-10-CM

## 2023-05-30 DIAGNOSIS — Z85.3 PERSONAL HISTORY OF MALIGNANT NEOPLASM OF BREAST: ICD-10-CM

## 2023-05-30 DIAGNOSIS — Z90.13 ACQUIRED ABSENCE OF BILATERAL BREASTS AND NIPPLES: ICD-10-CM

## 2023-05-30 DIAGNOSIS — N65.0 DEFORMITY OF RECONSTRUCTED BREAST: ICD-10-CM

## 2023-05-30 DIAGNOSIS — Z90.13 ACQUIRED ABSENCE OF BILATERAL BREASTS AND NIPPLES: Chronic | ICD-10-CM

## 2023-05-30 DIAGNOSIS — Z96.653 PRESENCE OF ARTIFICIAL KNEE JOINT, BILATERAL: Chronic | ICD-10-CM

## 2023-05-30 PROCEDURE — 15771 GRFG AUTOL FAT LIPO 50 CC/<: CPT

## 2023-05-30 PROCEDURE — 88304 TISSUE EXAM BY PATHOLOGIST: CPT

## 2023-05-30 PROCEDURE — 15772 GRFG AUTOL FAT LIPO EA ADDL: CPT

## 2023-05-30 PROCEDURE — 88304 TISSUE EXAM BY PATHOLOGIST: CPT | Mod: 26

## 2023-05-30 PROCEDURE — 19380 REVJ RECONSTRUCTED BREAST: CPT | Mod: 50

## 2023-05-30 RX ORDER — ONDANSETRON 8 MG/1
4 TABLET, FILM COATED ORAL ONCE
Refills: 0 | Status: DISCONTINUED | OUTPATIENT
Start: 2023-05-30 | End: 2023-05-30

## 2023-05-30 RX ORDER — HYDROMORPHONE HYDROCHLORIDE 2 MG/ML
0.5 INJECTION INTRAMUSCULAR; INTRAVENOUS; SUBCUTANEOUS
Refills: 0 | Status: DISCONTINUED | OUTPATIENT
Start: 2023-05-30 | End: 2023-05-30

## 2023-05-30 RX ORDER — SODIUM CHLORIDE 9 MG/ML
1000 INJECTION, SOLUTION INTRAVENOUS
Refills: 0 | Status: DISCONTINUED | OUTPATIENT
Start: 2023-05-30 | End: 2023-05-30

## 2023-05-30 RX ADMIN — SODIUM CHLORIDE 50 MILLILITER(S): 9 INJECTION, SOLUTION INTRAVENOUS at 07:52

## 2023-05-30 RX ADMIN — HYDROMORPHONE HYDROCHLORIDE 0.5 MILLIGRAM(S): 2 INJECTION INTRAMUSCULAR; INTRAVENOUS; SUBCUTANEOUS at 11:18

## 2023-05-30 RX ADMIN — HYDROMORPHONE HYDROCHLORIDE 0.5 MILLIGRAM(S): 2 INJECTION INTRAMUSCULAR; INTRAVENOUS; SUBCUTANEOUS at 11:33

## 2023-05-30 NOTE — ASU DISCHARGE PLAN (ADULT/PEDIATRIC) - PATIENT BELONGINGS
Can we see if she has recent labs with PCP including TSH, MAG, CBC, and CMP please?   Patient's belongings returned

## 2023-05-30 NOTE — BRIEF OPERATIVE NOTE - NSICDXBRIEFPROCEDURE_GEN_ALL_CORE_FT
PROCEDURES:  Breast revision surgery 30-May-2023 10:55:24 [place] breast revision/reconstruction Sae Sheikh

## 2023-05-30 NOTE — ASU PATIENT PROFILE, ADULT - FALL HARM RISK - HARM RISK INTERVENTIONS

## 2023-05-31 RX ORDER — VITAMIN E 100 UNIT
0 CAPSULE ORAL
Refills: 0 | DISCHARGE

## 2023-05-31 RX ORDER — BACILLUS COAGULANS/INULIN 1B-250 MG
1 CAPSULE ORAL
Refills: 0 | DISCHARGE

## 2023-05-31 RX ORDER — METOPROLOL TARTRATE 50 MG
1 TABLET ORAL
Refills: 0 | DISCHARGE

## 2023-05-31 RX ORDER — CHOLECALCIFEROL (VITAMIN D3) 125 MCG
0 CAPSULE ORAL
Refills: 0 | DISCHARGE

## 2023-06-07 LAB — SURGICAL PATHOLOGY STUDY: SIGNIFICANT CHANGE UP

## 2023-07-16 ENCOUNTER — NON-APPOINTMENT (OUTPATIENT)
Age: 48
End: 2023-07-16

## 2024-02-27 NOTE — ASU PATIENT PROFILE, ADULT - HOW PATIENT ADDRESSED, PROFILE
Vicki would like to speak with one of Dr Fan's nurses. She did not state why she wanted to speak with the nurse. She can be reached at [phone]    Tiffanie

## 2024-03-21 NOTE — ASU DISCHARGE PLAN (ADULT/PEDIATRIC) - ASU DISCHARGE DATE/TIME
Goal Outcome Evaluation:    Pt is A/O per usual. Vss, afebrile. Denies pain and sob. Up ad daphne in room and around nursing unit. Continent of bowel/bladder. Good appetite. Calm and cooperative with cares. Stable t/o shift. No behaviors noted. Awaiting placement.    30-May-2023 00:00 30-May-2023 13:45
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
## 2024-08-26 ENCOUNTER — APPOINTMENT (OUTPATIENT)
Dept: ORTHOPEDIC SURGERY | Facility: CLINIC | Age: 49
End: 2024-08-26
Payer: COMMERCIAL

## 2024-08-26 VITALS — HEIGHT: 65 IN | WEIGHT: 179 LBS | BODY MASS INDEX: 29.82 KG/M2

## 2024-08-26 DIAGNOSIS — M75.31 CALCIFIC TENDINITIS OF RIGHT SHOULDER: ICD-10-CM

## 2024-08-26 DIAGNOSIS — M75.51 BURSITIS OF RIGHT SHOULDER: ICD-10-CM

## 2024-08-26 DIAGNOSIS — I10 ESSENTIAL (PRIMARY) HYPERTENSION: ICD-10-CM

## 2024-08-26 DIAGNOSIS — Z78.9 OTHER SPECIFIED HEALTH STATUS: ICD-10-CM

## 2024-08-26 PROCEDURE — 73030 X-RAY EXAM OF SHOULDER: CPT | Mod: RT

## 2024-08-26 PROCEDURE — 73010 X-RAY EXAM OF SHOULDER BLADE: CPT | Mod: RT

## 2024-08-26 PROCEDURE — 99204 OFFICE O/P NEW MOD 45 MIN: CPT

## 2024-08-26 RX ORDER — METOPROLOL SUCCINATE 100 MG/1
100 TABLET, EXTENDED RELEASE ORAL
Refills: 0 | Status: ACTIVE | COMMUNITY

## 2024-08-26 RX ORDER — METHYLPREDNISOLONE 4 MG/1
4 TABLET ORAL
Qty: 1 | Refills: 0 | Status: ACTIVE | COMMUNITY
Start: 2024-08-26 | End: 1900-01-01

## 2024-08-26 NOTE — IMAGING
[Right] : right shoulder [FreeTextEntry1] : There is a lateral calcium.  There are early GH changes.  There is osteopenia. [FreeTextEntry5] : There is a Type II acromion.

## 2024-08-26 NOTE — HISTORY OF PRESENT ILLNESS
[10] : 10 [Dull/Aching] : dull/aching [Nothing helps with pain getting better] : Nothing helps with pain getting better [de-identified] : NP right shoulder pain since yesterday when picking up her son. has tried ice and motrin. no previous injury/sx. denies n/t [] : no [FreeTextEntry1] : R shoulder
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
## 2024-08-26 NOTE — ASSESSMENT
[FreeTextEntry1] : We reviewed the findings and the history. Questions were answered and concerns addressed. The options were outlined. A MDP is planned. Cryocuff advised.  Table slides demonstrated.  We will see her back next week for new AP/Outlet XRs. An injection could be considered.  Patient was seen by Dr. Tristan Guerra. Patient was seen by Tamera PATEL under the supervision of Dr. Tristan Guerra. Progress note was completed by Tamera PATEL. Entered by Ava Marcus acting as scribe.

## 2024-08-26 NOTE — PHYSICAL EXAM
[Right] : right shoulder [] : no sensory deficits [FreeTextEntry8] : She jumps. [FreeTextEntry9] : Range of motion was not assessed. [de-identified] : Strength was not assessed.

## 2024-08-26 NOTE — CONSULT LETTER
[Dear  ___] : Dear  [unfilled], [Consult Letter:] : I had the pleasure of evaluating your patient, [unfilled]. [FreeTextEntry1] : Thank you for referring your patient for consultation.  Please see my note below.   If you have any questions, please do not hesitate to contact me.   Sincerely,   Tristan Guerra M.D. Shoulder Surgery

## 2024-09-06 ENCOUNTER — APPOINTMENT (OUTPATIENT)
Dept: ORTHOPEDIC SURGERY | Facility: CLINIC | Age: 49
End: 2024-09-06

## 2024-09-09 DIAGNOSIS — Z00.00 ENCOUNTER FOR GENERAL ADULT MEDICAL EXAMINATION W/OUT ABNORMAL FINDINGS: ICD-10-CM

## 2024-09-10 ENCOUNTER — APPOINTMENT (OUTPATIENT)
Dept: ORTHOPEDIC SURGERY | Facility: CLINIC | Age: 49
End: 2024-09-10
Payer: COMMERCIAL

## 2024-09-10 VITALS — BODY MASS INDEX: 29.82 KG/M2 | HEIGHT: 65 IN | WEIGHT: 179 LBS

## 2024-09-10 DIAGNOSIS — S83.242A OTHER TEAR OF MEDIAL MENISCUS, CURRENT INJURY, LEFT KNEE, INITIAL ENCOUNTER: ICD-10-CM

## 2024-09-10 PROCEDURE — 73564 X-RAY EXAM KNEE 4 OR MORE: CPT | Mod: LT

## 2024-09-10 PROCEDURE — 99205 OFFICE O/P NEW HI 60 MIN: CPT

## 2024-09-10 NOTE — PHYSICAL EXAM
[de-identified] : Patient is well nourished, well-developed, in no acute distress, with appropriate mood and affect. The patient is oriented to time, place, and person. Respirations are even and unlabored. Gait evaluation does not reveal a limp. There is no inguinal adenopathy. Examination of the contralateral knee shows normal range of motion, strength, no tenderness, and intact skin. The affected limb is well-perfused and showed 2+ dp/pt pulses, without skin lesions, shows a grossly normal motor and sensory examination. Knee motion is painless and the knee moves from 0 to 135 degrees. The knee is stable within that range-of-motion to AP and ML stress with a 1A Lachman, negative anterior or posterior drawer and no instability to varus or valgus stress. The alignment of the knee is 5 degrees varus. No effusion or crepitus is noted.  Tender to palpation the medial joint line.  No tenderness to palpation about the lateral joint line, medial or lateral tibial plateau, medial or lateral femoral condyle, medial or lateral patellar facets, superior or inferior pole of the patella. Arthur's is positive medially. Muscle strength is normal. Pedal pulses are palpable. Hip examination was negative. [de-identified] : Long standing knee, AP knee, lateral knee, and patellar views of the left knee were ordered and taken in the office and demonstrate no evidence of degenerative joint disease of the knee, fractures, intra-articular pathology.  The patient brings with her an MRI report of the left knee.  Reviewed the MRI report with the patient was demonstrates a medial meniscus tear.

## 2024-09-10 NOTE — HISTORY OF PRESENT ILLNESS
[de-identified] : This is very nice 48-year-old female experiencing left medial knee pain, which is severe in intensity. The pain substantially limits activities of daily living. Walking tolerance is reduced.  She has been previously diagnosed with left knee medial meniscus tear.  She has tried hyaluronic acid injections and cortisone injections.  The patient denies any radiation of the pain to the feet and it is not associated with numbness, tingling, or weakness.

## 2024-09-10 NOTE — DISCUSSION/SUMMARY
[de-identified] : This patient has left knee medial meniscus tear. An extensive discussion was conducted on the natural history of the disease and the variety of surgical and non-surgical options available to the patient including, but not limited to non-steroidal anti-inflammatory medications, steroid injections, physical therapy, maintenance of ideal body weight, and reduction of activity.  She has already tried cortisone junctions and hyaluronic acid injections.  She can consider physical therapy.  I recommend meloxicam for this diagnosis but she prefers to avoid this.  I think that she should consider surgery is another good option and she already has a sports medicine surgeon for that.  The patient will schedule an appointment as needed.

## 2024-09-11 NOTE — PHYSICAL EXAM
[Right] : right shoulder [] : no sensory deficits [FreeTextEntry8] : She jumps. [FreeTextEntry9] : Range of motion was not assessed. [de-identified] : Strength was not assessed.

## 2024-09-11 NOTE — PHYSICAL EXAM
[Right] : right shoulder [] : no sensory deficits [FreeTextEntry8] : She jumps. [FreeTextEntry9] : Range of motion was not assessed. [de-identified] : Strength was not assessed.

## 2024-09-11 NOTE — PHYSICAL EXAM
[Right] : right shoulder [] : no sensory deficits [FreeTextEntry8] : She jumps. [FreeTextEntry9] : Range of motion was not assessed. [de-identified] : Strength was not assessed.

## 2024-09-11 NOTE — HISTORY OF PRESENT ILLNESS
[10] : 10 [Dull/Aching] : dull/aching [Nothing helps with pain getting better] : Nothing helps with pain getting better [de-identified] : Follow UP R. Shoulder [] : no [FreeTextEntry1] : R shoulder

## 2024-09-11 NOTE — HISTORY OF PRESENT ILLNESS
[10] : 10 [Dull/Aching] : dull/aching [Nothing helps with pain getting better] : Nothing helps with pain getting better [de-identified] : Follow UP R. Shoulder [] : no [FreeTextEntry1] : R shoulder

## 2024-09-11 NOTE — HISTORY OF PRESENT ILLNESS
[10] : 10 [Dull/Aching] : dull/aching [Nothing helps with pain getting better] : Nothing helps with pain getting better [de-identified] : Follow UP R. Shoulder [] : no [FreeTextEntry1] : R shoulder

## 2025-01-17 ENCOUNTER — NON-APPOINTMENT (OUTPATIENT)
Age: 50
End: 2025-01-17

## 2025-01-17 DIAGNOSIS — C50.919 MALIGNANT NEOPLASM OF UNSPECIFIED SITE OF UNSPECIFIED FEMALE BREAST: ICD-10-CM

## 2025-01-17 DIAGNOSIS — F43.10 POST-TRAUMATIC STRESS DISORDER, UNSPECIFIED: ICD-10-CM

## 2025-01-17 DIAGNOSIS — Z77.098 CONTACT WITH AND (SUSPECTED) EXPOSURE TO OTHER HAZARDOUS, CHIEFLY NONMEDICINAL, CHEMICALS: ICD-10-CM

## 2025-01-17 DIAGNOSIS — I10 ESSENTIAL (PRIMARY) HYPERTENSION: ICD-10-CM

## 2025-01-22 ENCOUNTER — NON-APPOINTMENT (OUTPATIENT)
Age: 50
End: 2025-01-22

## 2025-01-22 ENCOUNTER — RESULT CHARGE (OUTPATIENT)
Age: 50
End: 2025-01-22

## 2025-01-22 ENCOUNTER — APPOINTMENT (OUTPATIENT)
Dept: PULMONOLOGY | Facility: CLINIC | Age: 50
End: 2025-01-22
Payer: COMMERCIAL

## 2025-01-22 VITALS — SYSTOLIC BLOOD PRESSURE: 111 MMHG | OXYGEN SATURATION: 100 % | DIASTOLIC BLOOD PRESSURE: 74 MMHG | HEART RATE: 92 BPM

## 2025-01-22 LAB — POCT - HEMOGLOBIN (HGB), QUANTITATIVE, TRANSCUTANEOUS: 14.3

## 2025-01-22 PROCEDURE — 81003 URINALYSIS AUTO W/O SCOPE: CPT | Mod: QW

## 2025-01-22 PROCEDURE — 36415 COLL VENOUS BLD VENIPUNCTURE: CPT

## 2025-01-22 PROCEDURE — 94727 GAS DIL/WSHOT DETER LNG VOL: CPT

## 2025-01-22 PROCEDURE — 94729 DIFFUSING CAPACITY: CPT

## 2025-01-22 PROCEDURE — 94060 EVALUATION OF WHEEZING: CPT

## 2025-01-22 PROCEDURE — 99205 OFFICE O/P NEW HI 60 MIN: CPT | Mod: 25

## 2025-01-22 PROCEDURE — 88738 HGB QUANT TRANSCUTANEOUS: CPT

## 2025-01-22 PROCEDURE — 71046 X-RAY EXAM CHEST 2 VIEWS: CPT

## 2025-01-22 PROCEDURE — 93000 ELECTROCARDIOGRAM COMPLETE: CPT

## 2025-01-22 PROCEDURE — ZZZZZ: CPT

## 2025-01-22 RX ORDER — VITAMIN K2 90 MCG
1000 CAPSULE ORAL
Refills: 0 | Status: ACTIVE | COMMUNITY

## 2025-01-22 RX ORDER — MULTIVIT-MIN/FOLIC/VIT K/LYCOP 400-300MCG
TABLET ORAL
Refills: 0 | Status: ACTIVE | COMMUNITY

## 2025-01-22 RX ORDER — MECOBALAMIN 1000 MCG
1 TABLET,CHEWABLE ORAL
Refills: 0 | Status: ACTIVE | COMMUNITY

## 2025-01-23 ENCOUNTER — NON-APPOINTMENT (OUTPATIENT)
Age: 50
End: 2025-01-23

## 2025-01-23 DIAGNOSIS — E78.5 HYPERLIPIDEMIA, UNSPECIFIED: ICD-10-CM

## 2025-01-23 LAB
ALBUMIN SERPL ELPH-MCNC: 4.2 G/DL
ALP BLD-CCNC: 57 U/L
ALT SERPL-CCNC: 9 U/L
ANION GAP SERPL CALC-SCNC: 9 MMOL/L
AST SERPL-CCNC: 11 U/L
BASOPHILS # BLD AUTO: 0.04 K/UL
BASOPHILS NFR BLD AUTO: 0.8 %
BILIRUB SERPL-MCNC: 0.7 MG/DL
BUN SERPL-MCNC: 10 MG/DL
CALCIUM SERPL-MCNC: 9.4 MG/DL
CHLORIDE SERPL-SCNC: 105 MMOL/L
CHOLEST SERPL-MCNC: 229 MG/DL
CO2 SERPL-SCNC: 27 MMOL/L
CREAT SERPL-MCNC: 1.03 MG/DL
EGFR: 66 ML/MIN/1.73M2
EOSINOPHIL # BLD AUTO: 0.1 K/UL
EOSINOPHIL NFR BLD AUTO: 2 %
GLUCOSE SERPL-MCNC: 88 MG/DL
HCT VFR BLD CALC: 39.9 %
HDLC SERPL-MCNC: 58 MG/DL
HGB BLD-MCNC: 13.4 G/DL
IMM GRANULOCYTES NFR BLD AUTO: 0.2 %
LDLC SERPL CALC-MCNC: 124 MG/DL
LYMPHOCYTES # BLD AUTO: 2.34 K/UL
LYMPHOCYTES NFR BLD AUTO: 47.1 %
MAN DIFF?: NORMAL
MCHC RBC-ENTMCNC: 30.3 PG
MCHC RBC-ENTMCNC: 33.6 G/DL
MCV RBC AUTO: 90.3 FL
MONOCYTES # BLD AUTO: 0.35 K/UL
MONOCYTES NFR BLD AUTO: 7 %
NEUTROPHILS # BLD AUTO: 2.13 K/UL
NEUTROPHILS NFR BLD AUTO: 42.9 %
NONHDLC SERPL-MCNC: 171 MG/DL
PLATELET # BLD AUTO: 364 K/UL
POTASSIUM SERPL-SCNC: 3.7 MMOL/L
PROT SERPL-MCNC: 6.7 G/DL
RBC # BLD: 4.42 M/UL
RBC # FLD: 12.2 %
SODIUM SERPL-SCNC: 141 MMOL/L
TRIGL SERPL-MCNC: 270 MG/DL
WBC # FLD AUTO: 4.97 K/UL

## (undated) DEVICE — SUT PLAIN GUT FAST ABSORBING 5-0 PC-1

## (undated) DEVICE — PACK MINOR NO DRAPE

## (undated) DEVICE — TUBE INFILTRATING

## (undated) DEVICE — PACK MINOR

## (undated) DEVICE — DRAPE 3/4 SHEET 52X76"

## (undated) DEVICE — GOWN TRIMAX LG

## (undated) DEVICE — SOL IRR POUR H2O 1500ML

## (undated) DEVICE — DRAPE IOBAN 10X5"

## (undated) DEVICE — WRAP COMPRESSION CALF MED

## (undated) DEVICE — VENODYNE/SCD SLEEVE CALF LARGE

## (undated) DEVICE — SOL IRR POUR NS 0.9% 500ML

## (undated) DEVICE — DRAPE SPLIT SHEETS 77X108"

## (undated) DEVICE — DRAPE 3/4 SHEET W REINFORCEMENT 56X77"

## (undated) DEVICE — SPONGE LAP PAD W RING 18X18"

## (undated) DEVICE — DRSG DERMABOND 0.7ML

## (undated) DEVICE — WARMING BLANKET LOWER ADULT

## (undated) DEVICE — ELCTR GROUNDING PAD ADULT COVIDIEN

## (undated) DEVICE — DRSG CURITY GAUZE SPONGE 4 X 4" 12-PLY

## (undated) DEVICE — BLADE SURGICAL #10 CARBON

## (undated) DEVICE — POSITIONER FOAM HEAD CRADLE (PINK)

## (undated) DEVICE — DRSG KLING 6"

## (undated) DEVICE — POSITIONER STRAP ARMBOARD VELCRO TS-30

## (undated) DEVICE — TUBING HI-VAC PSI-TEC STERILE

## (undated) DEVICE — DRSG GAUZE PETROLEUM 3X9"

## (undated) DEVICE — BLANKET WARMER LOWER ADULT

## (undated) DEVICE — DRAPE HALF SHEET 40X57"

## (undated) DEVICE — SUT MONOCRYL 4-0 27" PS-2 UNDYED

## (undated) DEVICE — DRSG SURGICAL BRA XL 40-42

## (undated) DEVICE — MARKER SKIN MULTI TIP 6"

## (undated) DEVICE — DRSG STERISTRIPS 0.5 X 4"

## (undated) DEVICE — DRAPE INSTRUMENT POUCH

## (undated) DEVICE — DRSG STERISTRIPS 0.25 X 4"

## (undated) DEVICE — SUT POLYSORB 2-0 30" V-20 UNDYED

## (undated) DEVICE — SUT MONOCRYL 5-0 18" P-3 UNDYED

## (undated) DEVICE — SUT DERMABOND MINI

## (undated) DEVICE — SUT MONOCRYL 3-0 27" PS-2 UNDYED

## (undated) DEVICE — SOL IRR POUR H2O 1000ML

## (undated) DEVICE — DRAPE SPLIT SHEET 77" X 108"

## (undated) DEVICE — DRAIN JACKSON PRATT 10MM FLAT FULL NO TROCAR

## (undated) DEVICE — GLV 7.5 PROTEXIS

## (undated) DEVICE — STAPLER SKIN VISI-STAT 35 WIDE

## (undated) DEVICE — GLV 6.5 PROTEGRITY NEU-THERA

## (undated) DEVICE — SYR LUER LOK 10CC

## (undated) DEVICE — BLADE SCALPEL SAFETYLOCK #15

## (undated) DEVICE — DRAPE IOBAN 10" X 8"

## (undated) DEVICE — SUT MONOCRYL 3-0 27" KS CS-1 UNDYED

## (undated) DEVICE — SOLIDIFIER CANN EXPRESS 3K

## (undated) DEVICE — DRSG 4X4

## (undated) DEVICE — ELCTR EDGE BOVIE INSULATED BLADE TIP 2.75"

## (undated) DEVICE — NEEDLE COUNTER  FOAM AND MAGNET 40-70

## (undated) DEVICE — DRAPE TOWEL BLUE 17" X 24"

## (undated) DEVICE — DRAIN RESERVOIR FOR JACKSON PRATT 100CC CARDINAL

## (undated) DEVICE — ELCTR BOVIE TIP BLADE INSULATED 2.75" EDGE

## (undated) DEVICE — SUT MONOCRYL 3-0 18" PS-2 UNDYED

## (undated) DEVICE — SOL IRR POUR NS 0.9% 1000ML